# Patient Record
Sex: FEMALE | Race: WHITE | NOT HISPANIC OR LATINO | URBAN - NONMETROPOLITAN AREA
[De-identification: names, ages, dates, MRNs, and addresses within clinical notes are randomized per-mention and may not be internally consistent; named-entity substitution may affect disease eponyms.]

---

## 2019-12-11 ENCOUNTER — IMPORTED ENCOUNTER (OUTPATIENT)
Dept: URBAN - NONMETROPOLITAN AREA CLINIC 1 | Facility: CLINIC | Age: 69
End: 2019-12-11

## 2019-12-11 PROBLEM — L72.3: Noted: 2019-12-11

## 2019-12-11 PROCEDURE — 92002 INTRM OPH EXAM NEW PATIENT: CPT

## 2019-12-11 NOTE — PATIENT DISCUSSION
Sebacous Cyst RUL -Discussed diagnosis in detail with patient -patient state sits been there for a few months and she has tried Keflex with no succesful.  Recc surgical removal -Discussed RBA's in detail with patient and she wishes to proceed with removal -Arely to call and schedule removal for patient task sent [phone]

## 2019-12-17 ENCOUNTER — IMPORTED ENCOUNTER (OUTPATIENT)
Dept: URBAN - NONMETROPOLITAN AREA CLINIC 1 | Facility: CLINIC | Age: 69
End: 2019-12-17

## 2019-12-17 NOTE — PATIENT DISCUSSION
Sebacous Cyst RLL -Discussed diagnosis in detail with patient -patient state sits been there for a few months and she has tried Keflex with no succesful.  Recc surgical removal -Discussed RBA's in detail with patient and she wishes to proceed with removal -Arely to call and schedule removal for patient task sent [phone]

## 2019-12-27 ENCOUNTER — IMPORTED ENCOUNTER (OUTPATIENT)
Dept: URBAN - NONMETROPOLITAN AREA CLINIC 1 | Facility: CLINIC | Age: 69
End: 2019-12-27

## 2019-12-27 PROBLEM — Z98.890: Noted: 2019-12-27

## 2019-12-27 PROCEDURE — 99024 POSTOP FOLLOW-UP VISIT: CPT

## 2019-12-27 NOTE — PATIENT DISCUSSION
10 day POV Excision of Sebacous Cyst RLL 12/17/19 with Suture Removal - Discussed diagnosis in detail with patient - Patient is stable and doing well- Removed sutures from OD at slitlamp without complications - D/C E-mycin ointment - Continue to monitor - RTC PRN or complete

## 2022-04-09 ASSESSMENT — VISUAL ACUITY
OS_SC: 20/25
OD_SC: 20/29
OS_SC: 20/20
OD_SC: 20/25